# Patient Record
(demographics unavailable — no encounter records)

---

## 2025-02-20 NOTE — ASSESSMENT
[FreeTextEntry1] : BRITTANI is a 23 month old girl now s/p bilateral myringotomy with tubes 3/27/24  Eustachian Tube Dysfunction - audiogram within normal limits 4/4/24 - expectant management, monitor PET q6months until extrusion

## 2025-02-20 NOTE — CONSULT LETTER
[Dear  ___] : Dear  [unfilled], [Courtesy Letter:] : I had the pleasure of seeing your patient, [unfilled], in my office today. [Please see my note below.] : Please see my note below. [Consult Closing:] : Thank you very much for allowing me to participate in the care of this patient.  If you have any questions, please do not hesitate to contact me. [Sincerely,] : Sincerely, [FreeTextEntry2] : Dr. Tremaine Solorzano [address] [place] [id number]   [phone] [FreeTextEntry3] : Lisandra Coroando MD Pediatric Otolaryngology / Head and Neck Surgery    [facility] [address] [place] [address] Tel [phone] Fax [phone]    [address] [facility] [address] [place] [address]  Tel [phone] Fax [phone]

## 2025-02-20 NOTE — PHYSICAL EXAM
[Placement/Patency] : tympanostomy tube in place and patent [Clear/Ventilated] : middle ear clear and well ventilated [Exposed Vessel] : left anterior vessel not exposed [1+] : 1+ [Increased Work of Breathing] : no increased work of breathing with use of accessory muscles and retractions [Normal Gait and Station] : normal gait and station [Normal muscle strength, symmetry and tone of facial, head and neck musculature] : normal muscle strength, symmetry and tone of facial, head and neck musculature [Normal] : no cervical lymphadenopathy

## 2025-02-20 NOTE — HISTORY OF PRESENT ILLNESS
[de-identified] : Today I had the pleasure of seeing BRITTANI OLEARY for follow up.  History was obtained from patient, mother and chart.  s/p bilateral myringotomy with tubes 3/27/24  [de-identified] : frequent drainage, foul smelling, goes away with the drops usually within a week but has had oral abx 2-3 times as well congestion when sick